# Patient Record
Sex: MALE | Race: BLACK OR AFRICAN AMERICAN | NOT HISPANIC OR LATINO | Employment: STUDENT | URBAN - METROPOLITAN AREA
[De-identification: names, ages, dates, MRNs, and addresses within clinical notes are randomized per-mention and may not be internally consistent; named-entity substitution may affect disease eponyms.]

---

## 2019-07-01 ENCOUNTER — HOSPITAL ENCOUNTER (EMERGENCY)
Facility: HOSPITAL | Age: 24
Discharge: HOME | End: 2019-07-01
Attending: EMERGENCY MEDICINE
Payer: MEDICAID

## 2019-07-01 VITALS
SYSTOLIC BLOOD PRESSURE: 138 MMHG | DIASTOLIC BLOOD PRESSURE: 84 MMHG | OXYGEN SATURATION: 98 % | BODY MASS INDEX: 37.67 KG/M2 | WEIGHT: 240 LBS | RESPIRATION RATE: 20 BRPM | TEMPERATURE: 98 F | HEART RATE: 76 BPM | HEIGHT: 67 IN

## 2019-07-01 DIAGNOSIS — R07.1 PAINFUL BREATHING: ICD-10-CM

## 2019-07-01 DIAGNOSIS — R31.9 HEMATURIA, UNSPECIFIED TYPE: Primary | ICD-10-CM

## 2019-07-01 DIAGNOSIS — R10.9 LEFT FLANK PAIN: ICD-10-CM

## 2019-07-01 LAB
BILIRUBIN, POC UA: NEGATIVE
BLOOD, POC UA: ABNORMAL
CLARITY, POC UA: CLEAR
COLOR, POC UA: YELLOW
GLUCOSE, POC UA: NEGATIVE
KETONES, POC UA: NEGATIVE
LEUKOCYTE EST, POC UA: NEGATIVE
NITRITE, POC UA: NEGATIVE
PH UR STRIP: 6 [PH]
PROTEIN, POC UA: ABNORMAL
SPECIFIC GRAVITY, POC UA: 1.02
UROBILINOGEN, POC UA: 0.2 E.U./DL

## 2019-07-01 PROCEDURE — 25000003 PHARM REV CODE 250: Mod: ER | Performed by: EMERGENCY MEDICINE

## 2019-07-01 PROCEDURE — 63600175 PHARM REV CODE 636 W HCPCS: Mod: ER | Performed by: EMERGENCY MEDICINE

## 2019-07-01 PROCEDURE — 99284 EMERGENCY DEPT VISIT MOD MDM: CPT | Mod: 25,ER

## 2019-07-01 PROCEDURE — 81003 URINALYSIS AUTO W/O SCOPE: CPT | Mod: ER

## 2019-07-01 PROCEDURE — 96372 THER/PROPH/DIAG INJ SC/IM: CPT | Mod: ER

## 2019-07-01 RX ORDER — DOCUSATE SODIUM 100 MG/1
100 CAPSULE, LIQUID FILLED ORAL 3 TIMES DAILY PRN
Qty: 60 CAPSULE | Refills: 0 | Status: SHIPPED | OUTPATIENT
Start: 2019-07-01 | End: 2020-08-28

## 2019-07-01 RX ORDER — DIAZEPAM 5 MG/1
10 TABLET ORAL
Status: COMPLETED | OUTPATIENT
Start: 2019-07-01 | End: 2019-07-01

## 2019-07-01 RX ORDER — DICYCLOMINE HYDROCHLORIDE 20 MG/1
20 TABLET ORAL EVERY 6 HOURS
Qty: 30 TABLET | Refills: 0 | Status: SHIPPED | OUTPATIENT
Start: 2019-07-01

## 2019-07-01 RX ORDER — INDOMETHACIN 25 MG/1
50 CAPSULE ORAL
Qty: 30 CAPSULE | Refills: 0 | Status: SHIPPED | OUTPATIENT
Start: 2019-07-01 | End: 2020-08-28

## 2019-07-01 RX ORDER — DICYCLOMINE HYDROCHLORIDE 10 MG/ML
20 INJECTION INTRAMUSCULAR
Status: COMPLETED | OUTPATIENT
Start: 2019-07-01 | End: 2019-07-01

## 2019-07-01 RX ORDER — KETOROLAC TROMETHAMINE 10 MG/1
10 TABLET, FILM COATED ORAL
Status: COMPLETED | OUTPATIENT
Start: 2019-07-01 | End: 2019-07-01

## 2019-07-01 RX ADMIN — DICYCLOMINE HYDROCHLORIDE 20 MG: 20 INJECTION, SOLUTION INTRAMUSCULAR at 03:07

## 2019-07-01 RX ADMIN — KETOROLAC TROMETHAMINE 10 MG: 10 TABLET, FILM COATED ORAL at 02:07

## 2019-07-01 RX ADMIN — DIAZEPAM 10 MG: 5 TABLET ORAL at 03:07

## 2019-07-01 NOTE — ED PROVIDER NOTES
"Encounter Date: 7/1/2019    SCRIBE #1 NOTE: I, Sterling Marie, am scribing for, and in the presence of,  Dr. Jarvis. I have scribed the following portions of the note - Other sections scribed: HPI, ROS, PE.       History     Chief Complaint   Patient presents with    Back Pain     pt reports he has been having lower back pain x 2 weeks. pt reports he has seen his PCP and was told to take muscle relaxers.     Hematuria     pt reports he noticed blood in urine last night. denies pain with urination or penile discharge.     Rogelio Galloway is a 24 y.o. male who presents to the ED complaining of 8/10 lower left sided back pain onset 2 weeks ago.  Back pain worsens when leaning towards one side, improves when sitting straight up.  Pt also complains of frontal neck pain that feels like "soreness", worsens when taking deep breaths onset 2 days.  Pt also complains of blood in urine since last night without burning or dysuria.  Denies nausea, vomiting, fever, congestion, cough, and abdominal pain.  Pt is allergic to penicillin.  He has been told that he had blood in the urine in the past.    The history is provided by the patient. No  was used.     Review of patient's allergies indicates:   Allergen Reactions    Penicillins Other (See Comments)     Was told to never give it to him     Past Medical History:   Diagnosis Date    Bronchitis      History reviewed. No pertinent surgical history.  History reviewed. No pertinent family history.  Social History     Tobacco Use    Smoking status: Never Smoker   Substance Use Topics    Alcohol use: Not on file    Drug use: Not on file     Review of Systems   Constitutional: Negative for fever.   HENT: Negative for congestion.    Respiratory: Negative for cough.    Gastrointestinal: Negative for abdominal pain, nausea and vomiting.   Genitourinary: Positive for hematuria. Negative for difficulty urinating and dysuria.   Musculoskeletal: Positive for back pain and " neck pain.       Physical Exam     Initial Vitals [07/01/19 1357]   BP Pulse Resp Temp SpO2   129/88 76 18 97.9 °F (36.6 °C) 99 %      MAP       --         Physical Exam    Nursing note and vitals reviewed.  Constitutional: He appears well-developed and well-nourished.   HENT:   Head: Normocephalic and atraumatic.   Mouth/Throat: Uvula is midline, oropharynx is clear and moist and mucous membranes are normal.   Eyes: Conjunctivae are normal.   Neck: Normal range of motion. Neck supple. Muscular tenderness present. No edema present.       Cardiovascular: Normal rate, regular rhythm, normal heart sounds and intact distal pulses. Exam reveals no gallop and no friction rub.    No murmur heard.  Pulmonary/Chest: Breath sounds normal. No respiratory distress. He has no wheezes. He has no rhonchi. He has no rales.   Abdominal: Soft. Bowel sounds are normal. He exhibits no distension. There is no tenderness. There is no rigidity, no rebound and no guarding.   Musculoskeletal: Normal range of motion.   Lymphadenopathy:     He has no cervical adenopathy.   Neurological: He is alert and oriented to person, place, and time.   Skin: Skin is warm and dry.   Psychiatric: He has a normal mood and affect. His behavior is normal.         ED Course   Procedures  Labs Reviewed   POCT URINALYSIS W/O SCOPE - Abnormal; Notable for the following components:       Result Value    Glucose, UA Negative (*)     Bilirubin, UA Negative (*)     Ketones, UA Negative (*)     Blood, UA 2+ (*)     Protein, UA 1+ (*)     Nitrite, UA Negative (*)     Leukocytes, UA Negative (*)     All other components within normal limits   POCT URINALYSIS W/O SCOPE          Imaging Results          X-Ray Chest PA And Lateral (Final result)  Result time 07/01/19 14:54:54    Final result by Topher Bailey MD (07/01/19 14:54:54)                 Impression:      No acute chest disease identified.      Electronically signed by: Topher Bailey  MD  Date:    07/01/2019  Time:    14:54             Narrative:    EXAMINATION:  XR CHEST PA AND LATERAL    CLINICAL HISTORY:  Chest pain on breathing    TECHNIQUE:  PA and lateral views of the chest were performed.    COMPARISON:  None    FINDINGS:  The cardiac silhouette and superior mediastinal structures are unremarkable. Pulmonary vasculature is within normal limits. The lungs are well aerated and free of focal consolidations. There is no evidence for pneumothorax or pleural effusions. Bony structures are grossly intact.                               CT Renal Stone Study ABD Pelvis WO (Final result)  Result time 07/01/19 14:53:03    Final result by Sarbjit Miller MD (07/01/19 14:53:03)                 Impression:      1. No acute CT abnormalities within the abdomen or pelvis to correlate with left flank pain, or hematuria as clinically questioned.  2. Additional findings above.      Electronically signed by: Sarbjit Miller MD  Date:    07/01/2019  Time:    14:53             Narrative:    EXAMINATION:  CT RENAL STONE STUDY ABD PELVIS WO    CLINICAL HISTORY:  Hematuria; Unspecified abdominal pain    TECHNIQUE:  Low dose axial images, sagittal and coronal reformations were obtained from the lung bases to the pubic symphysis.  Contrast was not administered.    COMPARISON:  None    FINDINGS:  Images of the lower thorax are remarkable for a 1 mm pulmonary nodule within the right upper lobe.    The liver, spleen, pancreas, gallbladder and adrenal glands have a grossly unremarkable noncontrast appearance.  There is no biliary dilation or ascites.  No significant abdominal lymphadenopathy.    The kidneys have a grossly unremarkable noncontrast appearance without hydronephrosis or nephrolithiasis.  Bilateral ureters are unremarkable without calculi seen.  The urinary bladder is unremarkable.  The prostate is not enlarged.    There is moderate stool in the colon.  The terminal ileum and appendix are unremarkable.  The  small bowel is grossly unremarkable.  No bulky pelvic lymphadenopathy.  No focal organized pelvic fluid collection.    No focal osseous destructive process.  No significant inguinal lymphadenopathy.                                 Medical Decision Making:   Initial Assessment:   This is a 24 y.o. male who presents to the ED complaining of hematuria, back pain, and neck pain.    Patient denies nausea, vomiting, fever, congestion, cough, and abdominal pain.    Physical exam significant for muscular tenderness in the front neck area.  He has no tenderness to the back  Clinical Tests:   Lab Tests: Ordered and Reviewed  Radiological Study: Ordered and Reviewed  ED Management:  Will order chest X-Ray, renal CT, and UA.  Renal stone study shows no evidence of kidney stone.  Does show large amount of stool in the colon which is likely the etiology of the pain.  Will treat with ketorolac tablet.  This did not improve the patient's pain he was then given Bentyl as well as Valium with much improvement of pain.  He will be discharged on indomethacin, Bentyl and Colace.  He will be referred to Urology for the hematuria            Scribe Attestation:   Scribe #1: I performed the above scribed service and the documentation accurately describes the services I performed. I attest to the accuracy of the note.       I, Dr. Teodora Jarvis, personally performed the services described in this documentation. All medical record entries made by the scribe were at my direction and in my presence.  I have reviewed the chart and agree that the record reflects my personal performance and is accurate and complete. Teodora Jarvis MD.  4:27 PM 07/01/2019             Clinical Impression:     1. Hematuria, unspecified type    2. Painful breathing    3. Left flank pain                                   Teodora Jarvis MD  07/01/19 7278

## 2020-08-28 ENCOUNTER — OFFICE VISIT (OUTPATIENT)
Dept: PRIMARY CARE CLINIC | Facility: CLINIC | Age: 25
End: 2020-08-28
Payer: MEDICAID

## 2020-08-28 VITALS
DIASTOLIC BLOOD PRESSURE: 82 MMHG | OXYGEN SATURATION: 98 % | WEIGHT: 260.56 LBS | BODY MASS INDEX: 41.88 KG/M2 | RESPIRATION RATE: 18 BRPM | HEIGHT: 66 IN | TEMPERATURE: 98 F | SYSTOLIC BLOOD PRESSURE: 120 MMHG | HEART RATE: 101 BPM

## 2020-08-28 DIAGNOSIS — N30.00 ACUTE CYSTITIS WITHOUT HEMATURIA: ICD-10-CM

## 2020-08-28 DIAGNOSIS — Z13.6 ENCOUNTER FOR LIPID SCREENING FOR CARDIOVASCULAR DISEASE: ICD-10-CM

## 2020-08-28 DIAGNOSIS — E66.3 OVERWEIGHT: ICD-10-CM

## 2020-08-28 DIAGNOSIS — Z11.59 NEED FOR HEPATITIS C SCREENING TEST: ICD-10-CM

## 2020-08-28 DIAGNOSIS — Z21 ASYMPTOMATIC HIV INFECTION: Primary | ICD-10-CM

## 2020-08-28 DIAGNOSIS — Z13.220 ENCOUNTER FOR LIPID SCREENING FOR CARDIOVASCULAR DISEASE: ICD-10-CM

## 2020-08-28 LAB
BILIRUB SERPL-MCNC: ABNORMAL MG/DL
BLOOD URINE, POC: 250
CLARITY, POC UA: ABNORMAL
COLOR, POC UA: YELLOW
GLUCOSE UR QL STRIP: ABNORMAL
KETONES UR QL STRIP: ABNORMAL
LEUKOCYTE ESTERASE URINE, POC: ABNORMAL
NITRITE, POC UA: ABNORMAL
PH, POC UA: 5
PROTEIN, POC: ABNORMAL
SPECIFIC GRAVITY, POC UA: 1.02
UROBILINOGEN, POC UA: ABNORMAL

## 2020-08-28 PROCEDURE — 99204 OFFICE O/P NEW MOD 45 MIN: CPT | Mod: S$PBB,,, | Performed by: INTERNAL MEDICINE

## 2020-08-28 PROCEDURE — 99999 PR PBB SHADOW E&M-EST. PATIENT-LVL IV: ICD-10-PCS | Mod: PBBFAC,,, | Performed by: INTERNAL MEDICINE

## 2020-08-28 PROCEDURE — 99999 PR PBB SHADOW E&M-EST. PATIENT-LVL IV: CPT | Mod: PBBFAC,,, | Performed by: INTERNAL MEDICINE

## 2020-08-28 PROCEDURE — 99204 PR OFFICE/OUTPT VISIT, NEW, LEVL IV, 45-59 MIN: ICD-10-PCS | Mod: S$PBB,,, | Performed by: INTERNAL MEDICINE

## 2020-08-28 PROCEDURE — 81002 URINALYSIS NONAUTO W/O SCOPE: CPT | Mod: PBBFAC,PN | Performed by: INTERNAL MEDICINE

## 2020-08-28 PROCEDURE — 99214 OFFICE O/P EST MOD 30 MIN: CPT | Mod: PBBFAC,PN | Performed by: INTERNAL MEDICINE

## 2020-08-28 RX ORDER — ELVITEGRAVIR, COBICISTAT, EMTRICITABINE, AND TENOFOVIR ALAFENAMIDE 150; 150; 200; 10 MG/1; MG/1; MG/1; MG/1
1 TABLET ORAL DAILY
COMMUNITY
Start: 2020-06-12 | End: 2020-08-28 | Stop reason: SDUPTHER

## 2020-08-28 RX ORDER — ELVITEGRAVIR, COBICISTAT, EMTRICITABINE, AND TENOFOVIR ALAFENAMIDE 150; 150; 200; 10 MG/1; MG/1; MG/1; MG/1
1 TABLET ORAL DAILY
Qty: 90 TABLET | Refills: 3 | Status: SHIPPED | OUTPATIENT
Start: 2020-08-28 | End: 2020-10-05 | Stop reason: SDUPTHER

## 2020-08-28 RX ORDER — CIPROFLOXACIN 500 MG/1
500 TABLET ORAL 2 TIMES DAILY
Qty: 14 TABLET | Refills: 0 | Status: SHIPPED | OUTPATIENT
Start: 2020-08-28 | End: 2020-09-04

## 2020-08-28 NOTE — PROGRESS NOTES
Subjective:       Patient ID: Rogelio Galloway is a 25 y.o. male.    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 He was diagnosed with HIV infection since 2014 due non safe sex he has been treated with antiHIV med by ID since then he states that his last viral count was negative and CD4 count was good he is not on any prophylactic abx and denies any h/o oportotunistic infections he has been seen every 6 mos by ID . He currently denies any physical symptoms no sob cp fever chill night sweat dsyuria hematuria wt gain or loss. No PMH except as above no PSH no smoke no etoh denies any drug use . His F/H Lupus grandfather breast cancer mother DM  Review of Systems   Constitutional: Negative for unexpected weight change.   HENT: Negative for congestion and postnasal drip.    Eyes: Negative for visual disturbance.   Respiratory: Negative for shortness of breath.    Cardiovascular: Negative for chest pain.   Gastrointestinal: Negative for abdominal pain.   Genitourinary: Negative for difficulty urinating.   Musculoskeletal: Negative for arthralgias.   Psychiatric/Behavioral: Negative for dysphoric mood.       Objective:      Physical Exam  Vitals signs and nursing note reviewed.   Constitutional:       General: He is not in acute distress.     Appearance: He is well-developed.   HENT:      Head: Normocephalic and atraumatic.      Right Ear: Tympanic membrane, ear canal and external ear normal.      Left Ear: Tympanic membrane, ear canal and external ear normal.      Nose: Nose normal.      Mouth/Throat:      Mouth: Mucous membranes are moist.      Pharynx: No oropharyngeal exudate.   Eyes:      Extraocular Movements: Extraocular movements intact.      Conjunctiva/sclera: Conjunctivae normal.      Pupils: Pupils are equal, round, and reactive to light.   Neck:      Musculoskeletal: Normal range of  motion and neck supple.      Thyroid: No thyromegaly.   Cardiovascular:      Rate and Rhythm: Normal rate and regular rhythm.      Heart sounds: Normal heart sounds. No murmur. No friction rub. No gallop.    Pulmonary:      Effort: Pulmonary effort is normal. No respiratory distress.      Breath sounds: Normal breath sounds. No wheezing or rales.   Abdominal:      General: Bowel sounds are normal. There is no distension.      Palpations: Abdomen is soft.      Tenderness: There is no abdominal tenderness. There is no guarding.   Musculoskeletal: Normal range of motion.         General: No tenderness or deformity.   Lymphadenopathy:      Cervical: No cervical adenopathy.   Skin:     General: Skin is warm and dry.      Findings: No erythema or rash.   Neurological:      General: No focal deficit present.      Mental Status: He is alert and oriented to person, place, and time.   Psychiatric:         Mood and Affect: Mood normal.         Thought Content: Thought content normal.         Judgment: Judgment normal.         Assessment:       1. Asymptomatic HIV infection    2. Need for hepatitis C screening test    3. Overweight    4. Encounter for lipid screening for cardiovascular disease    5. Acute cystitis without hematuria        Plan:       Asymptomatic HIV infection  -     HIV RNA, QUANTITATIVE, PCR; Future; Expected date: 08/28/2020  -     T-HELPER CELLS (CD4) COUNT; Future; Expected date: 08/28/2020  -     POCT urine dipstick without microscope  -     CBC auto differential; Future; Expected date: 08/28/2020  -     Comprehensive metabolic panel; Future; Expected date: 08/28/2020  -     GENVOYA [id number] mg Tab; Take 1 tablet by mouth once daily.  Dispense: 90 tablet; Refill: 3  -     Ambulatory referral/consult to Infectious Disease; Future; Expected date: 09/04/2020    Need for hepatitis C screening test  -     Hepatitis C Antibody; Future; Expected date: 08/28/2020    Overweight  -     TSH; Future; Expected  date: 08/28/2020    Encounter for lipid screening for cardiovascular disease  -     Lipid Panel; Future; Expected date: 08/28/2020    Acute cystitis without hematuria  -     ciprofloxacin HCl (CIPRO) 500 MG tablet; Take 1 tablet (500 mg total) by mouth 2 (two) times daily. for 7 days  Dispense: 14 tablet; Refill: 0

## 2020-09-23 ENCOUNTER — OFFICE VISIT (OUTPATIENT)
Dept: INFECTIOUS DISEASES | Facility: CLINIC | Age: 25
End: 2020-09-23
Payer: COMMERCIAL

## 2020-09-23 ENCOUNTER — CLINICAL SUPPORT (OUTPATIENT)
Dept: INFECTIOUS DISEASES | Facility: CLINIC | Age: 25
End: 2020-09-23
Payer: COMMERCIAL

## 2020-09-23 VITALS
BODY MASS INDEX: 40.8 KG/M2 | HEIGHT: 67 IN | WEIGHT: 259.94 LBS | DIASTOLIC BLOOD PRESSURE: 96 MMHG | HEART RATE: 114 BPM | TEMPERATURE: 99 F | SYSTOLIC BLOOD PRESSURE: 142 MMHG

## 2020-09-23 DIAGNOSIS — Z21 HIV POSITIVE: ICD-10-CM

## 2020-09-23 DIAGNOSIS — Z21 ASYMPTOMATIC HIV INFECTION: Primary | ICD-10-CM

## 2020-09-23 DIAGNOSIS — Z21 ASYMPTOMATIC HIV INFECTION: ICD-10-CM

## 2020-09-23 PROCEDURE — 99999 PR PBB SHADOW E&M-EST. PATIENT-LVL III: CPT | Mod: PBBFAC,,, | Performed by: INTERNAL MEDICINE

## 2020-09-23 PROCEDURE — 90472 IMMUNIZATION ADMIN EACH ADD: CPT | Mod: PBBFAC

## 2020-09-23 PROCEDURE — 90734 MENACWYD/MENACWYCRM VACC IM: CPT | Mod: PBBFAC

## 2020-09-23 PROCEDURE — 99204 OFFICE O/P NEW MOD 45 MIN: CPT | Mod: S$GLB,,, | Performed by: INTERNAL MEDICINE

## 2020-09-23 PROCEDURE — 99999 PR PBB SHADOW E&M-EST. PATIENT-LVL III: ICD-10-PCS | Mod: PBBFAC,,, | Performed by: INTERNAL MEDICINE

## 2020-09-23 PROCEDURE — 99213 OFFICE O/P EST LOW 20 MIN: CPT | Mod: PBBFAC,25 | Performed by: INTERNAL MEDICINE

## 2020-09-23 PROCEDURE — 90686 IIV4 VACC NO PRSV 0.5 ML IM: CPT | Mod: PBBFAC

## 2020-09-23 PROCEDURE — 99204 PR OFFICE/OUTPT VISIT, NEW, LEVL IV, 45-59 MIN: ICD-10-PCS | Mod: S$GLB,,, | Performed by: INTERNAL MEDICINE

## 2020-09-23 PROCEDURE — 90715 TDAP VACCINE 7 YRS/> IM: CPT | Mod: PBBFAC

## 2020-09-23 NOTE — LETTER
September 24, 2020      Nir Marsh MD  [address] [place] [address]           [facility] - Infectious Disease 1st Fl  [address]  [facility] [address]  Phone: [phone]  Fax: [phone]          Patient: Rogelio Galloway   MR Number: [id number]   YOB: 1995   Date of Visit: 9/23/2020       Dear Dr. Nir Marsh:    Thank you for referring Rogelio Galloway to me for evaluation. Attached you will find relevant portions of my assessment and plan of care.    If you have questions, please do not hesitate to call me. I look forward to following Rogelio Galloway along with you.    Sincerely,    Mili Pineda,     Enclosure  CC:  No Recipients    If you would like to receive this communication electronically, please contact [email or web address] or [phone] to request more information on [employer] Link access.    For providers and/or their staff who would like to refer a patient to [facility], please contact us through our one-stop-shop provider referral line, [facility], at [phone].    If you feel you have received this communication in error or would no longer like to receive these types of communications, please e-mail [email or web address]

## 2020-09-24 PROBLEM — Z21 HIV POSITIVE: Status: ACTIVE | Noted: 2020-09-24

## 2020-09-24 NOTE — PROGRESS NOTES
Subjective:     Patient ID: Mr. Rogelio Galloway is a 25 y.o. male    Chief Complaint   Patient presents with    HIV Positive/AIDS        HPI:    25M PMH HIV who presents to ID clinic today to establish care for HIV management. He no acute issues on today's visit. Patient was diagnosed with HIV as a teenager, was initially on stribild, and is currently on genvoya. He endorses a few missed doses several months ago due to a delay in prescription refill. Otherwise, he takes his medication every day as prescribed. He has no side effects to genvoya, and is not on any other medications concerning or drug drug interaction. He states he moved around a lot as a child before settling in [place]. He is not currently sexually active or in a relationship. He lives at home with family. He has a pet cat. 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     Immunization History:  Childhood vaccines:  yes  Annual Flu vaccine: no - due  Tetanus/TDAP: due  Hepatitis A: 2013  Hepatitis B: 2015  Prevnar-13: 2014  Pneumovax-23: due  Menactra: 2014 - due  Zostavax/Shingrix: at age 52    Review of Systems   Constitution: Negative for chills, decreased appetite, fever, malaise/fatigue and night sweats.   HENT: Negative for congestion and sore throat.    Eyes: Negative for blurred vision, double vision, vision loss in left eye, vision loss in right eye and visual disturbance.   Cardiovascular: Negative for chest pain, dyspnea on exertion, irregular heartbeat and leg swelling.   Respiratory: Negative for cough, hemoptysis, shortness of breath and sputum production.    Hematologic/Lymphatic: Negative for adenopathy. Does not bruise/bleed easily.   Skin: Negative for rash and suspicious lesions.   Musculoskeletal: Negative for arthritis, joint pain, muscle weakness and myalgias.   Gastrointestinal: Negative for abdominal pain, constipation, diarrhea, heartburn, nausea and  vomiting.   Genitourinary: Negative for dysuria, flank pain, frequency and genital sores.   Neurological: Negative for dizziness, focal weakness, numbness, paresthesias, sensory change, tremors and weakness.   Psychiatric/Behavioral: Negative for altered mental status and depression. The patient is not nervous/anxious.    Allergic/Immunologic: Negative for environmental allergies and persistent infections.        Past Medical History:   Diagnosis Date    Bronchitis     HIV infection 2014    HIV positive      History reviewed. No pertinent surgical history.  Family History   Family history unknown: Yes     Social History     Tobacco Use    Smoking status: Never Smoker    Smokeless tobacco: Never Used   Substance Use Topics    Alcohol use: Not Currently     Comment: socially    Drug use: Never       Objective:     Physical Exam  Constitutional:       General: He is not in acute distress.     Appearance: Normal appearance. He is well-developed. He is not diaphoretic.   HENT:      Head: Atraumatic.      Right Ear: External ear normal.      Left Ear: External ear normal.      Nose: Nose normal.      Mouth/Throat:      Mouth: Mucous membranes are moist.      Pharynx: Oropharynx is clear. No oropharyngeal exudate or posterior oropharyngeal erythema.   Eyes:      General: No scleral icterus.        Right eye: No discharge.         Left eye: No discharge.      Extraocular Movements: Extraocular movements intact.      Conjunctiva/sclera: Conjunctivae normal.   Neck:      Musculoskeletal: Normal range of motion and neck supple.   Cardiovascular:      Rate and Rhythm: Normal rate and regular rhythm.      Heart sounds: Normal heart sounds. No murmur.   Pulmonary:      Effort: Pulmonary effort is normal. No respiratory distress.      Breath sounds: Normal breath sounds. No wheezing.   Abdominal:      General: Bowel sounds are normal. There is no distension.      Palpations: Abdomen is soft.      Tenderness: There is no  abdominal tenderness.   Musculoskeletal: Normal range of motion.         General: No swelling or deformity.   Skin:     General: Skin is warm and dry.      Findings: No erythema or rash.   Neurological:      General: No focal deficit present.      Mental Status: He is alert and oriented to person, place, and time.      Cranial Nerves: No cranial nerve deficit.   Psychiatric:         Mood and Affect: Mood normal.         Behavior: Behavior normal.         Data:    All data, including recent labs, radiology, and pathology, has been independently reviewed.    Labs:    Recent Labs   Lab Result Units 08/28/20  1245   WBC K/uL 10.40   Hemoglobin g/dL 15.2   Hematocrit % 43.4   Sodium mmol/L 135*   Potassium mmol/L 4.1   Chloride mmol/L 98*   BUN, Bld mg/dL 8   Creatinine mg/dL 0.9   AST U/L 32   ALT U/L 44   Alkaline Phosphatase U/L 55   Total Bilirubin mg/dL 0.7   HIV-1 RNA, Qual  DETECTED*   HIV 1 RNA Ultra Copies/mL 41*        Radiology:    No results found in the last 24 hours.     Assessment:    1. Asymptomatic HIV infection  Ambulatory referral/consult to Infectious Disease    Pneumococcal Polysaccharide Vaccine (23 Valent) (SQ/IM)    Influenza - Quadrivalent (PF)    Tdap Vaccine    Meningococcal Conjugate - MCV4P (MENACTRA)    Vaccines updated today. Will repeat all bloodwork at patient's next visit and Q6m after. Continue genvoya, patient educated on potential for drug drug interactions w/ genvoya, and will let me know if he starts taking any new medication.        These recommendations have been sent to and/or discussed with the following providers:   - Dr. Nir Marsh   - Shanita Lobo MD    Follow up in 6 months    Ngoc Pineda DO  Infectious Disease

## 2020-10-03 ENCOUNTER — PATIENT MESSAGE (OUTPATIENT)
Dept: INFECTIOUS DISEASES | Facility: CLINIC | Age: 25
End: 2020-10-03

## 2020-10-03 DIAGNOSIS — Z21 ASYMPTOMATIC HIV INFECTION: ICD-10-CM

## 2020-10-05 ENCOUNTER — PATIENT MESSAGE (OUTPATIENT)
Dept: INFECTIOUS DISEASES | Facility: CLINIC | Age: 25
End: 2020-10-05

## 2020-10-05 RX ORDER — ELVITEGRAVIR, COBICISTAT, EMTRICITABINE, AND TENOFOVIR ALAFENAMIDE 150; 150; 200; 10 MG/1; MG/1; MG/1; MG/1
1 TABLET ORAL DAILY
Qty: 90 TABLET | Refills: 3 | Status: SHIPPED | OUTPATIENT
Start: 2020-10-05

## 2020-12-04 ENCOUNTER — OFFICE VISIT (OUTPATIENT)
Dept: PRIMARY CARE CLINIC | Facility: CLINIC | Age: 25
End: 2020-12-04
Payer: COMMERCIAL

## 2020-12-04 VITALS
DIASTOLIC BLOOD PRESSURE: 78 MMHG | BODY MASS INDEX: 42.57 KG/M2 | HEART RATE: 82 BPM | HEIGHT: 66 IN | WEIGHT: 264.88 LBS | TEMPERATURE: 98 F | OXYGEN SATURATION: 96 % | SYSTOLIC BLOOD PRESSURE: 126 MMHG

## 2020-12-04 DIAGNOSIS — E78.5 HYPERLIPIDEMIA, UNSPECIFIED HYPERLIPIDEMIA TYPE: ICD-10-CM

## 2020-12-04 DIAGNOSIS — R31.29 MICROSCOPIC HEMATURIA: Primary | ICD-10-CM

## 2020-12-04 DIAGNOSIS — Z21 ASYMPTOMATIC HIV INFECTION: ICD-10-CM

## 2020-12-04 LAB
BILIRUB SERPL-MCNC: ABNORMAL MG/DL
BLOOD URINE, POC: ABNORMAL
CLARITY, POC UA: CLEAR
COLOR, POC UA: ABNORMAL
GLUCOSE UR QL STRIP: NORMAL
KETONES UR QL STRIP: ABNORMAL
LEUKOCYTE ESTERASE URINE, POC: ABNORMAL
NITRITE, POC UA: ABNORMAL
PH, POC UA: 8
PROTEIN, POC: ABNORMAL
SPECIFIC GRAVITY, POC UA: 1
UROBILINOGEN, POC UA: NORMAL

## 2020-12-04 PROCEDURE — 81002 URINALYSIS NONAUTO W/O SCOPE: CPT | Mod: S$GLB,,, | Performed by: INTERNAL MEDICINE

## 2020-12-04 PROCEDURE — 81002 POCT URINE DIPSTICK WITHOUT MICROSCOPE: ICD-10-PCS | Mod: S$GLB,,, | Performed by: INTERNAL MEDICINE

## 2020-12-04 PROCEDURE — 1126F PR PAIN SEVERITY QUANTIFIED, NO PAIN PRESENT: ICD-10-PCS | Mod: S$GLB,,, | Performed by: INTERNAL MEDICINE

## 2020-12-04 PROCEDURE — 3008F PR BODY MASS INDEX (BMI) DOCUMENTED: ICD-10-PCS | Mod: CPTII,S$GLB,, | Performed by: INTERNAL MEDICINE

## 2020-12-04 PROCEDURE — 99213 OFFICE O/P EST LOW 20 MIN: CPT | Mod: 25,S$GLB,, | Performed by: INTERNAL MEDICINE

## 2020-12-04 PROCEDURE — 99213 PR OFFICE/OUTPT VISIT, EST, LEVL III, 20-29 MIN: ICD-10-PCS | Mod: 25,S$GLB,, | Performed by: INTERNAL MEDICINE

## 2020-12-04 PROCEDURE — 3008F BODY MASS INDEX DOCD: CPT | Mod: CPTII,S$GLB,, | Performed by: INTERNAL MEDICINE

## 2020-12-04 PROCEDURE — 99999 PR PBB SHADOW E&M-EST. PATIENT-LVL III: CPT | Mod: PBBFAC,,, | Performed by: INTERNAL MEDICINE

## 2020-12-04 PROCEDURE — 99999 PR PBB SHADOW E&M-EST. PATIENT-LVL III: ICD-10-PCS | Mod: PBBFAC,,, | Performed by: INTERNAL MEDICINE

## 2020-12-04 PROCEDURE — 1126F AMNT PAIN NOTED NONE PRSNT: CPT | Mod: S$GLB,,, | Performed by: INTERNAL MEDICINE

## 2020-12-04 NOTE — PROGRESS NOTES
Subjective:       Patient ID: Rogelio Galloway is a 25 y.o. male.    Chief Complaint: Follow-up (on lab result)    HPI   Pt visit today for f/u and review labs labs are stable CD4 count normal viral load minimally elevated and pt had miscroscopic hematuria las vist no symptoms U/A better almost resolve trace Pt has no symptoms and has no side from anti HIV med.  Review of Systems   He has sl increase chol triglyceride  Objective:      Physical Exam  Vitals signs and nursing note reviewed.   Constitutional:       General: He is not in acute distress.     Appearance: He is well-developed.   HENT:      Head: Normocephalic and atraumatic.      Right Ear: External ear normal.      Left Ear: External ear normal.      Nose: Nose normal.      Mouth/Throat:      Pharynx: No oropharyngeal exudate.   Eyes:      Extraocular Movements: Extraocular movements intact.      Conjunctiva/sclera: Conjunctivae normal.      Pupils: Pupils are equal, round, and reactive to light.   Neck:      Musculoskeletal: Normal range of motion and neck supple.      Thyroid: No thyromegaly.   Cardiovascular:      Rate and Rhythm: Normal rate and regular rhythm.      Heart sounds: Normal heart sounds. No murmur. No friction rub. No gallop.    Pulmonary:      Effort: Pulmonary effort is normal. No respiratory distress.      Breath sounds: Normal breath sounds. No wheezing or rales.   Abdominal:      General: Bowel sounds are normal. There is no distension.      Palpations: Abdomen is soft.      Tenderness: There is no abdominal tenderness. There is no guarding.   Musculoskeletal: Normal range of motion.         General: No tenderness or deformity.   Lymphadenopathy:      Cervical: No cervical adenopathy.   Skin:     General: Skin is warm and dry.      Capillary Refill: Capillary refill takes less than 2 seconds.      Findings: No erythema or rash.   Neurological:      General: No focal deficit present.      Mental Status: He is alert and oriented to  person, place, and time.   Psychiatric:         Mood and Affect: Mood normal.         Thought Content: Thought content normal.         Judgment: Judgment normal.         Assessment:       1. Microscopic hematuria    2. Asymptomatic HIV infection    3. Hyperlipidemia, unspecified hyperlipidemia type        Plan:       Microscopic hematuria  Comments:  resolving repeat in 3mos had CT scan kidneys bladder normal 7/20  Orders:  -     POCT urine dipstick without microscope    Asymptomatic HIV infection  Comments:  continue withGENOYA repeat viral load in 6 mos    Hyperlipidemia, unspecified hyperlipidemia type  Comments:  pt will try diet low chol low fat diet repeat LIPIDS in 3 mos        Medication List with Changes/Refills   Current Medications    DICYCLOMINE (BENTYL) 20 MG TABLET    Take 1 tablet (20 mg total) by mouth every 6 (six) hours.    GENVOYA [phone] MG TAB    Take 1 tablet by mouth once daily.

## 2020-12-06 DIAGNOSIS — R31.29 MICROSCOPIC HEMATURIA: Primary | ICD-10-CM

## 2021-02-15 DIAGNOSIS — R05.9 COUGH: ICD-10-CM

## 2021-04-16 ENCOUNTER — PATIENT MESSAGE (OUTPATIENT)
Dept: RESEARCH | Facility: HOSPITAL | Age: 26
End: 2021-04-16

## 2022-01-21 ENCOUNTER — PATIENT MESSAGE (OUTPATIENT)
Dept: ADMINISTRATIVE | Facility: HOSPITAL | Age: 27
End: 2022-01-21
Payer: COMMERCIAL

## 2023-02-20 ENCOUNTER — HOSPITAL ENCOUNTER (EMERGENCY)
Facility: HOSPITAL | Age: 28
Discharge: HOME | End: 2023-02-20
Attending: EMERGENCY MEDICINE
Payer: MEDICAID

## 2023-02-20 VITALS
SYSTOLIC BLOOD PRESSURE: 155 MMHG | BODY MASS INDEX: 41.46 KG/M2 | HEART RATE: 88 BPM | HEIGHT: 66 IN | DIASTOLIC BLOOD PRESSURE: 78 MMHG | RESPIRATION RATE: 18 BRPM | TEMPERATURE: 99 F | OXYGEN SATURATION: 100 % | WEIGHT: 258 LBS

## 2023-02-20 DIAGNOSIS — M54.50 ACUTE MIDLINE LOW BACK PAIN WITHOUT SCIATICA: Primary | ICD-10-CM

## 2023-02-20 LAB
BACTERIA #/AREA URNS HPF: NORMAL /HPF
BILIRUB UR QL STRIP: NEGATIVE
CLARITY UR: CLEAR
COLOR UR: COLORLESS
GLUCOSE UR QL STRIP: NEGATIVE
HGB UR QL STRIP: ABNORMAL
KETONES UR QL STRIP: NEGATIVE
LEUKOCYTE ESTERASE UR QL STRIP: NEGATIVE
MICROSCOPIC COMMENT: NORMAL
NITRITE UR QL STRIP: NEGATIVE
PH UR STRIP: 6 [PH] (ref 5–8)
PROT UR QL STRIP: NEGATIVE
RBC #/AREA URNS HPF: 4 /HPF (ref 0–4)
SP GR UR STRIP: 1.01 (ref 1–1.03)
SQUAMOUS #/AREA URNS HPF: 3 /HPF
URN SPEC COLLECT METH UR: ABNORMAL
UROBILINOGEN UR STRIP-ACNC: NEGATIVE EU/DL
WBC #/AREA URNS HPF: 1 /HPF (ref 0–5)

## 2023-02-20 PROCEDURE — 96372 THER/PROPH/DIAG INJ SC/IM: CPT | Performed by: EMERGENCY MEDICINE

## 2023-02-20 PROCEDURE — 25000003 PHARM REV CODE 250: Performed by: EMERGENCY MEDICINE

## 2023-02-20 PROCEDURE — 81000 URINALYSIS NONAUTO W/SCOPE: CPT | Performed by: EMERGENCY MEDICINE

## 2023-02-20 PROCEDURE — 63600175 PHARM REV CODE 636 W HCPCS: Performed by: EMERGENCY MEDICINE

## 2023-02-20 PROCEDURE — 99284 EMERGENCY DEPT VISIT MOD MDM: CPT

## 2023-02-20 RX ORDER — DOXYCYCLINE HYCLATE 100 MG
100 TABLET ORAL 2 TIMES DAILY
COMMUNITY
Start: 2023-02-18

## 2023-02-20 RX ORDER — LIDOCAINE 50 MG/G
1 PATCH TOPICAL DAILY PRN
Qty: 30 PATCH | Refills: 0 | Status: SHIPPED | OUTPATIENT
Start: 2023-02-20

## 2023-02-20 RX ORDER — METHOCARBAMOL 500 MG/1
500 TABLET, FILM COATED ORAL 3 TIMES DAILY PRN
Qty: 30 TABLET | Refills: 0 | Status: SHIPPED | OUTPATIENT
Start: 2023-02-20 | End: 2023-03-02

## 2023-02-20 RX ORDER — KETOROLAC TROMETHAMINE 30 MG/ML
30 INJECTION, SOLUTION INTRAMUSCULAR; INTRAVENOUS
Status: COMPLETED | OUTPATIENT
Start: 2023-02-20 | End: 2023-02-20

## 2023-02-20 RX ORDER — METHOCARBAMOL 500 MG/1
500 TABLET, FILM COATED ORAL
Status: COMPLETED | OUTPATIENT
Start: 2023-02-20 | End: 2023-02-20

## 2023-02-20 RX ORDER — ESTRADIOL 2 MG/1
4 TABLET ORAL
COMMUNITY
Start: 2022-12-20

## 2023-02-20 RX ORDER — NAPROXEN 500 MG/1
500 TABLET ORAL 2 TIMES DAILY PRN
Qty: 20 TABLET | Refills: 0 | Status: SHIPPED | OUTPATIENT
Start: 2023-02-20 | End: 2023-03-02

## 2023-02-20 RX ORDER — SPIRONOLACTONE 100 MG/1
200 TABLET, FILM COATED ORAL
COMMUNITY
Start: 2022-11-09

## 2023-02-20 RX ADMIN — KETOROLAC TROMETHAMINE 30 MG: 30 INJECTION, SOLUTION INTRAMUSCULAR; INTRAVENOUS at 10:02

## 2023-02-20 RX ADMIN — METHOCARBAMOL 500 MG: 500 TABLET ORAL at 10:02

## 2023-02-21 NOTE — DISCHARGE INSTRUCTIONS
Please follow-up with your primary care provider for further management and evaluation of symptoms for the next 3-5 days.  Seek medical care for any worsening symptoms, decreased sensation or strength in the lower extremities, fever, or other concerns.

## 2023-02-21 NOTE — ED PROVIDER NOTES
Encounter Date: 2/20/2023    SCRIBE #1 NOTE: I, Anahi Dedra, am scribing for, and in the presence of,  Nri Mota MD.     History     Chief Complaint   Patient presents with    Back Pain     Since this am to low back, denies urinary symptoms or trauma     26 yo M presents to the ED with nontraumatic lower back pain. His past medical history is significant for HIV. He woke up this morning around 10 AM and started experiencing lower back pain around the tailbone region, while ambulating to the bathroom to urinate. He reports intermittent pain since, but experiences more constant pain with sitting down and movement. Attempted Tx with Tylenol, and stretching, without relief. Did report ambulating long distances yesterday as he went to a parade. No other exacerbating or alleviating factors. Denies dysuria, frequency, rectal pain, fever, incontinence, numbness, weakness, nausea, abdominal pain, rash, leg pain, or other associated symptoms. Denies recent falls/trauma/injuries. States she has active syphilis infection, started oral abx for treatment yesterday. Denies tobacco use. Admits to EtOH consumption socially, and marijuana use.    The history is provided by the patient.   Review of patient's allergies indicates:   Allergen Reactions    Penicillins Other (See Comments)     Was told to never give it to him     Past Medical History:   Diagnosis Date    Bronchitis     HIV infection 2014    HIV positive      History reviewed. No pertinent surgical history.  Family History   Family history unknown: Yes     Social History     Tobacco Use    Smoking status: Never    Smokeless tobacco: Never   Substance Use Topics    Alcohol use: Not Currently     Comment: socially    Drug use: Never     Review of Systems   Constitutional:  Negative for chills and fever.   HENT:  Negative for congestion.    Respiratory:  Negative for cough and shortness of breath.    Gastrointestinal:  Negative for abdominal pain, diarrhea, nausea and  No show / no call on 01.30.2019 and 01.09.2020   rectal pain.   Genitourinary:  Negative for dysuria.   Musculoskeletal:  Positive for back pain. Negative for arthralgias.   Skin:  Negative for rash.   Neurological:  Negative for weakness, numbness and headaches.     Physical Exam     Initial Vitals [02/20/23 2040]   BP Pulse Resp Temp SpO2   (!) 160/84 90 18 98.5 °F (36.9 °C) 100 %      MAP       --         Physical Exam    Nursing note and vitals reviewed.  Constitutional: He appears well-developed. He is not diaphoretic. No distress.   HENT:   Head: Normocephalic.   Eyes: EOM are normal.   Cardiovascular:  Normal rate and regular rhythm.           No murmur heard.  Pulmonary/Chest: Effort normal and breath sounds normal. He has no wheezes.   Abdominal: Abdomen is soft. He exhibits no distension. There is no abdominal tenderness.   Genitourinary:    Genitourinary Comments: Chaperone nurse present.  No pilonidal indurated lesion.  No erythema.  No rash noted.  No perirectal induration.     Musculoskeletal:         General: Normal range of motion.      Comments: Tenderness to the midline lower back, no rashes there.      Neurological: He is alert.   Skin: Skin is warm. No rash noted.       ED Course   Procedures  Labs Reviewed   URINALYSIS, REFLEX TO URINE CULTURE - Abnormal; Notable for the following components:       Result Value    Color, UA Colorless (*)     Occult Blood UA 1+ (*)     All other components within normal limits    Narrative:     Specimen Source->Urine   URINALYSIS MICROSCOPIC    Narrative:     Specimen Source->Urine          Imaging Results    None          Medications   ketorolac injection 30 mg (30 mg Intramuscular Given 2/20/23 2218)   methocarbamoL tablet 500 mg (500 mg Oral Given 2/20/23 2218)     Medical Decision Making:   History:   Old Medical Records: I decided to obtain old medical records.  Initial Assessment:     27-year-old male presenting with lower back pain.  Atraumatic back pain.  Patient reports walking long distance over the  last day.  No fevers reported.  On exam no overlying skin rash or induration.  Suspect musculoskeletal pain.  Patient provided symptomatic analgesia.  Recommendation is to follow-up primary care for further evaluation.  Patient reports being compliant with HAART.  Differential Diagnosis:   Muscle strain, abscess  Clinical Tests:   Lab Tests: Ordered and Reviewed  ED Management:      Problems considered includes back pain (Signs & symptoms, differentials)  Chronic problems impacting care includes HIV.  Plan was discussed with the patient.  This includes plan for follow up, return precautions, symptomatic treatment.    All questions or concerns have been addressed.            Scribe Attestation:   Scribe #1: I performed the above scribed service and the documentation accurately describes the services I performed. I attest to the accuracy of the note.                   Clinical Impression:   Final diagnoses:  [M54.50] Acute midline low back pain without sciatica (Primary)        ED Disposition Condition    Discharge Stable        I, Nir Mota, personally performed the services described in this documentation. All medical record entries made by the scribe were at my direction and in my presence. I have reviewed the chart and agree that the record reflects my personal performance and is accurate and complete.    ED Prescriptions       Medication Sig Dispense Start Date End Date Auth. Provider    naproxen (NAPROSYN) 500 MG tablet Take 1 tablet (500 mg total) by mouth 2 (two) times daily as needed (pain). 20 tablet 2/20/2023 3/2/2023 Nir Mota MD    methocarbamoL (ROBAXIN) 500 MG Tab Take 1 tablet (500 mg total) by mouth 3 (three) times daily as needed (pain). 30 tablet 2/20/2023 3/2/2023 Nir Mota MD    LIDOcaine (LIDODERM) 5 % Place 1 patch onto the skin daily as needed (pain). Remove & Discard patch within 12 hours or as directed by MD 30 patch 2/20/2023 -- Nir Mota MD          Follow-up  Information       Follow up With Specialties Details Why Contact Info    Nir Marsh MD Internal Medicine, Pediatrics Schedule an appointment as soon as possible for a visit in 3 days Primary care [address] [place] [address] [phone]      [facility] Emergency Medicine  If symptoms worsen [address]  [facility] [address] [phone]             Nir Mota MD  02/21/23 9578